# Patient Record
Sex: FEMALE | URBAN - METROPOLITAN AREA
[De-identification: names, ages, dates, MRNs, and addresses within clinical notes are randomized per-mention and may not be internally consistent; named-entity substitution may affect disease eponyms.]

---

## 2017-02-09 NOTE — PATIENT DISCUSSION
POAG, OU: INTRAOCULAR PRESSURE IS WITHIN ACCEPTABLE LIMITS OU. CONTINUE XALATAN QHS OS TO MANAGE INTRAOCULAR PRESSURE. RETURN FOR FOLLOW-UP AS SCHEDULED.

## 2017-02-09 NOTE — PATIENT DISCUSSION
POSTERIOR VITREOUS DETACHMENT, OD:  NO HOLES. NO TEARS. RETINAL  DETACHMENT WARNINGS DISCUSSED. FLOATERS AND FLASHES BROCHURE GIVEN. RETURN FOR FOLLOW-UP AS SCHEDULED.

## 2017-03-03 NOTE — PATIENT DISCUSSION
POAG, OU: INTRAOCULAR PRESSURE IS WITHIN ACCEPTABLE LIMITS. HVF STABLE (WNL OD, NONSPECIFIC CHANGES OS). PT INSTRUCTED TO CONTINUE XALATAN QHS OU AND RETURN FOR FOLLOW-UP AS SCHEDULED.

## 2017-08-09 NOTE — PATIENT DISCUSSION
POAG, OU: INTRAOCULAR PRESSURE IS WITHIN ACCEPTABLE LIMITS OU. CONTINUE XALATAN QHS OS TO MANAGE INTRAOCULAR PRESSURE. RETURN FOR FOLLOW-UP AS SCHEDULED. ESCRIBED REFILL OF XALATAN TODAY.

## 2018-02-28 NOTE — PATIENT DISCUSSION
POAG, OU- S/P SLT OU: INTRAOCULAR PRESSURE AND RNFL ARE WITHIN ACCEPTABLE LIMITS. CONTINUE LATANOPROST ONE DROP IN LEFT EYE EVERY NIGHT. PATIENT INSTRUCTED TO RETURN FOR FOLLOW-UP AS SCHEDULED. ESCRIBED REFILL OF LATANOPROST TODAY.

## 2018-05-04 NOTE — PATIENT DISCUSSION
POAG, OU: INTRAOCULAR PRESSURE IS WITHIN ACCEPTABLE LIMITS. PHOTOS TO DOCUMENT ONH, FIELDS STABLE. PT INSTRUCTED TO CONTINUE LATANOPROST QHS OS AND RETURN FOR FOLLOW-UP AS SCHEDULED.

## 2018-09-06 NOTE — PATIENT DISCUSSION
POAG, OU: INTRAOCULAR PRESSURE IS WITHIN ACCEPTABLE LIMITS. PT INSTRUCTED TO CONTINUE XALATAN QHS OS AND RETURN FOR FOLLOW-UP AS SCHEDULED.

## 2019-08-09 NOTE — PATIENT DISCUSSION
POAG, OU: PROGRESSION NOTED ON HVF OS (NONSPECIFIC CHANGES OS). ADD TIMOLOL QAM, CONTINUE LATANOPROST QHS OS. RETURN FOR FOLLOW AS SCHEDULED- 1 month IOP CHECK.

## 2019-08-09 NOTE — PATIENT DISCUSSION
New Prescription: timolol maleate (timolol maleate): drops: 0.25% 1 drop every morning into left eye 08-

## 2019-09-13 NOTE — PATIENT DISCUSSION
POAG, OU: INTRAOCULAR PRESSURE IS WITHIN ACCEPTABLE LIMITS. PT INSTRUCTED TO CONTINUE XALATAN QHS OS AND TIMOLOL QAM OS AND RETURN FOR FOLLOW-UP AS SCHEDULED.

## 2020-01-13 ENCOUNTER — IMPORTED ENCOUNTER (OUTPATIENT)
Dept: URBAN - METROPOLITAN AREA CLINIC 9 | Facility: CLINIC | Age: 30
End: 2020-01-13

## 2020-02-25 ENCOUNTER — IMPORTED ENCOUNTER (OUTPATIENT)
Dept: URBAN - METROPOLITAN AREA CLINIC 9 | Facility: CLINIC | Age: 30
End: 2020-02-25

## 2020-02-25 NOTE — PATIENT DISCUSSION
POAG, OU: INTRAOCULAR PRESSURE IS WITHIN ACCEPTABLE LIMITS. OCT STABLE. PT INSTRUCTED TO CONTINUE XALATAN QHS OS AND TIMOLOL QAM OS AND RETURN FOR FOLLOW-UP AS SCHEDULED.

## 2020-03-09 ENCOUNTER — IMPORTED ENCOUNTER (OUTPATIENT)
Dept: URBAN - METROPOLITAN AREA CLINIC 9 | Facility: CLINIC | Age: 30
End: 2020-03-09

## 2020-08-12 NOTE — PATIENT DISCUSSION
New Prescription: timolol maleate (timolol maleate): drops, once daily: 0.5% 1 drop every morning as directed into left eye 08-

## 2020-08-12 NOTE — PATIENT DISCUSSION
New Prescription: Xalatan (latanoprost): drops: 0.005% 1 drop at bedtime as directed into left eye 08-

## 2020-08-12 NOTE — PATIENT DISCUSSION
POAG, OU: INTRAOCULAR PRESSURE AND OCT ARE WITHIN ACCEPTABLE LIMITS. OCT STABLE. PT INSTRUCTED TO CONTINUE XALATAN QHS OS AND TIMOLOL QAM OS. DROPS E-SCRIBED TODAY. AND RETURN FOR FOLLOW-UP AS SCHEDULED.  PMH ADDRESSED COMPLIANCE OF DROPS WITH PT.

## 2021-02-12 NOTE — PATIENT DISCUSSION
POAG, OU: INTRAOCULAR PRESSURE IS WITHIN ACCEPTABLE LIMITS. HVF STABLE. PT INSTRUCTED TO CONTINUE XALATAN QHS OS AND TIMOLOL QAM OS - STRESSED COMPLIANCE WITH EYE DROPS TO AVOID VISION LOSS. RETURN FOR FOLLOW-UP AS SCHEDULED.

## 2021-02-12 NOTE — PATIENT DISCUSSION
Stopped Today: timolol maleate (timolol maleate): drops, once daily: 0.5% 1 drop every morning as directed into left eye 08-

## 2021-08-09 NOTE — PATIENT DISCUSSION
Continue: timolol maleate (timolol maleate): drops, once daily: 0.5% 1 drop every morning as directed into left eye 08-

## 2021-08-09 NOTE — PATIENT DISCUSSION
POAG, OU: INTRAOCULAR PRESSURE AND OCT ARE WITHIN ACCEPTABLE LIMITS. OCT STABLE. GONIO PREFORMED TODAY TO DOCUMENT ANGLES. PT INSTRUCTED TO CONTINUE XALATAN QHS OS AND TIMOLOL QAM OS. DROPS E-SCRIBED TODAY. REFER TO DR Eleni Bonilla FOR SLT EVALUATION BECAUSE OF NONCOMPLIANT DROP USAGE.

## 2021-08-13 NOTE — PATIENT DISCUSSION
New Prescription: Pred Forte (prednisolone acetate): drops,suspension: 1% 1 drop four times a day into affected eye 08-

## 2021-08-13 NOTE — PATIENT DISCUSSION
Continue: timolol maleate (timolol maleate): drops: 0.5% 1 drop every morning as directed into left eye 08-

## 2021-08-13 NOTE — PATIENT DISCUSSION
POAG, OU: IOP NOT AT TARGET, OU. DISCUSSED IMPORTANCE OF USING DROPS DAILY TO HELP PREVENT FURTHER VISION LOSS. CONTINUE TIMOLOL OU QAM AND LATANOPROST OU QHS. DISCUSSED RBA'S OF SLT.

## 2021-10-16 ASSESSMENT — KERATOMETRY
OD_AXISANGLE2_DEGREES: 101
OD_K2POWER_DIOPTERS: 44.25
OS_K2POWER_DIOPTERS: 43.75
OD_AXISANGLE2_DEGREES: 98
OS_K1POWER_DIOPTERS: 43
OD_K1POWER_DIOPTERS: 43
OS_AXISANGLE_DEGREES: 162
OD_K1POWER_DIOPTERS: 43
OS_AXISANGLE2_DEGREES: 72
OS_K2POWER_DIOPTERS: 44
OS_K1POWER_DIOPTERS: 43
OS_AXISANGLE_DEGREES: 159
OD_AXISANGLE_DEGREES: 11
OD_K2POWER_DIOPTERS: 44.25
OD_AXISANGLE_DEGREES: 8
OS_AXISANGLE2_DEGREES: 69

## 2021-10-16 ASSESSMENT — VISUAL ACUITY
OS_CC: 20/25 SN
OD_SC: 20/400 SN
OS_CC: 20/25 SN
OD_SC: 20/400 SN
OD_CC: 20/25 SN
OS_SC: CF 2FT SN
OS_CC: 20/20 SN
OD_CC: 20/25 SN
OS_SC: CF 2FT SN

## 2021-10-16 ASSESSMENT — TONOMETRY
OS_IOP_MMHG: 11
OD_IOP_MMHG: 14

## 2021-10-16 ASSESSMENT — PACHYMETRY
OS_CT_UM: 497.0
OD_CT_UM: 513.0

## 2021-11-12 NOTE — PROCEDURE NOTE: CLINICAL
PROCEDURE NOTE: SLT OS. Diagnosis: POAG, Mild. Anesthesia: Topical. Prep: Betadine Flush. Prior to treatment, the risks/benefits/alternatives were discussed. The patient wished to proceed with procedure. Lens:  SLT laser lens with goniosol. Power: 1mJ. Total applications: 001. Application 864 Degrees. Patient tolerated procedure well. There were no complications. Post-op instructions given. Abiodun Dotson

## 2023-02-20 NOTE — PATIENT DISCUSSION
Vertex Distance: High Dose Vitamin A Counseling: Side effects reviewed, pt to contact office should one occur.